# Patient Record
Sex: MALE | Race: BLACK OR AFRICAN AMERICAN | Employment: OTHER | URBAN - METROPOLITAN AREA
[De-identification: names, ages, dates, MRNs, and addresses within clinical notes are randomized per-mention and may not be internally consistent; named-entity substitution may affect disease eponyms.]

---

## 2017-09-12 ENCOUNTER — HOSPITAL ENCOUNTER (OUTPATIENT)
Dept: MRI IMAGING | Age: 77
Discharge: HOME | End: 2017-09-12
Payer: MEDICARE

## 2017-09-12 ENCOUNTER — HOSPITAL ENCOUNTER (OUTPATIENT)
Dept: LAB | Age: 77
Discharge: HOME | End: 2017-09-12
Payer: MEDICARE

## 2017-09-12 DIAGNOSIS — K76.0 FATTY LIVER: ICD-10-CM

## 2017-09-12 DIAGNOSIS — K86.2 PANCREAS CYST: ICD-10-CM

## 2017-09-12 LAB
ALBUMIN SERPL-MCNC: 3.8 G/DL (ref 3.4–5)
ALBUMIN/GLOB SERPL: 1.2 {RATIO} (ref 0.8–1.7)
ALP SERPL-CCNC: 61 U/L (ref 45–117)
ALT SERPL-CCNC: 16 U/L (ref 16–61)
AST SERPL-CCNC: 9 U/L (ref 15–37)
BILIRUB DIRECT SERPL-MCNC: 0.1 MG/DL (ref 0–0.2)
BILIRUB SERPL-MCNC: 0.5 MG/DL (ref 0.2–1)
GLOBULIN SER CALC-MCNC: 3.2 G/DL (ref 2–4)
PROT SERPL-MCNC: 7 G/DL (ref 6.4–8.2)

## 2017-09-12 PROCEDURE — 74183 MRI ABD W/O CNTR FLWD CNTR: CPT

## 2017-09-12 PROCEDURE — 74011250636 HC RX REV CODE- 250/636

## 2017-09-12 PROCEDURE — A9585 GADOBUTROL INJECTION: HCPCS

## 2017-09-12 RX ADMIN — GADOBUTROL 10 ML: 604.72 INJECTION INTRAVENOUS at 15:08

## 2018-09-01 ENCOUNTER — HOSPITAL ENCOUNTER (OUTPATIENT)
Dept: MRI IMAGING | Age: 78
Discharge: HOME | End: 2018-09-01
Payer: MEDICARE

## 2018-09-01 VITALS — BODY MASS INDEX: 25.84 KG/M2 | WEIGHT: 165 LBS

## 2018-09-01 DIAGNOSIS — K86.2 PANCREATIC CYST: ICD-10-CM

## 2018-09-01 LAB — CREAT UR-MCNC: 1.2 MG/DL (ref 0.6–1.3)

## 2018-09-01 PROCEDURE — 82565 ASSAY OF CREATININE: CPT

## 2018-09-01 PROCEDURE — A9577 INJ MULTIHANCE: HCPCS

## 2018-09-01 PROCEDURE — 74183 MRI ABD W/O CNTR FLWD CNTR: CPT

## 2018-09-01 PROCEDURE — 74011250636 HC RX REV CODE- 250/636

## 2018-09-01 RX ADMIN — GADOBENATE DIMEGLUMINE 15 ML: 529 INJECTION, SOLUTION INTRAVENOUS at 17:26

## 2018-09-01 RX ADMIN — GADOBENATE DIMEGLUMINE 5 ML: 529 INJECTION, SOLUTION INTRAVENOUS at 17:26

## 2018-09-05 ENCOUNTER — OFFICE VISIT (OUTPATIENT)
Dept: ORTHOPEDIC SURGERY | Facility: CLINIC | Age: 78
End: 2018-09-05

## 2018-09-05 VITALS
SYSTOLIC BLOOD PRESSURE: 144 MMHG | WEIGHT: 174.2 LBS | TEMPERATURE: 96.8 F | OXYGEN SATURATION: 99 % | BODY MASS INDEX: 27.34 KG/M2 | HEART RATE: 65 BPM | DIASTOLIC BLOOD PRESSURE: 77 MMHG | HEIGHT: 67 IN | RESPIRATION RATE: 16 BRPM

## 2018-09-05 DIAGNOSIS — M25.532 LEFT WRIST PAIN: ICD-10-CM

## 2018-09-05 DIAGNOSIS — S63.502A WRIST SPRAIN, LEFT, INITIAL ENCOUNTER: Primary | ICD-10-CM

## 2018-09-05 NOTE — PATIENT INSTRUCTIONS
Wear splint like a cast for 2 weeks and ice regularly Wrist Sprain: Care Instructions Your Care Instructions Your wrist hurts because you have stretched or torn ligaments, which connect the bones in your wrist. 
Wrist sprains usually take from 2 to 10 weeks to heal, but some take longer. Usually, the more pain you have, the more severe your wrist sprain is and the longer it will take to heal. You can heal faster and regain strength in your wrist with good home treatment. Follow-up care is a key part of your treatment and safety. Be sure to make and go to all appointments, and call your doctor if you are having problems. It's also a good idea to know your test results and keep a list of the medicines you take. How can you care for yourself at home? · Prop up your arm on a pillow when you ice it or anytime you sit or lie down for the next 3 days. Try to keep your wrist above the level of your heart. This will help reduce swelling. · Put ice or cold packs on your wrist for 10 to 20 minutes at a time. Try to do this every 1 to 2 hours for the next 3 days (when you are awake) or until the swelling goes down. Put a thin cloth between the ice pack and your skin. · After 2 or 3 days, if your swelling is gone, apply a heating pad set on low or a warm cloth to your wrist. This helps keep your wrist flexible. Some doctors suggest that you go back and forth between hot and cold. · If you have an elastic bandage, keep it on for the next 24 to 36 hours. The bandage should be snug but not so tight that it causes numbness or tingling. To rewrap the wrist, wrap the bandage around the hand a few times, beginning at the fingers. Then wrap it around the hand between the thumb and index finger, ending by circling the wrist several times. · If your doctor gave you a splint or brace, wear it as directed to protect your wrist until it has healed. · Take pain medicines exactly as directed. ¨ If the doctor gave you a prescription medicine for pain, take it as prescribed. ¨ If you are not taking a prescription pain medicine, ask your doctor if you can take an over-the-counter medicine. · Try not to use your injured wrist and hand. When should you call for help? Call your doctor now or seek immediate medical care if: 
  · Your hand or fingers are cool or pale or change color.  
 Watch closely for changes in your health, and be sure to contact your doctor if: 
  · Your pain gets worse.  
  · Your wrist has not improved after 1 week. Where can you learn more? Go to http://valdo-val.info/. Enter G541 in the search box to learn more about \"Wrist Sprain: Care Instructions. \" Current as of: November 29, 2017 Content Version: 11.7 © 5140-8705 [employer]. Care instructions adapted under license by [employer] (which disclaims liability or warranty for this information). If you have questions about a medical condition or this instruction, always ask your healthcare professional. [address] any warranty or liability for your use of this information.

## 2018-09-05 NOTE — PROGRESS NOTES
Ryder Echevarria is a 76 y.o. male right handed retiree. Worker's Compensation and legal considerations: none filed. Vitals:  
 09/05/18 1400 BP: 144/77 Pulse: 65 Resp: 16 Temp: 96.8 °F (36 °C) TempSrc: Oral  
SpO2: 99% Weight: 174 lb 3.2 oz (79 kg) Height: 5' 7\" (1.702 m) PainSc:   5 Chief Complaint Patient presents with  Wrist Pain LEFT WRIST PAIN  
 
 
 
HPI: L Wrist pain/Questionable fracture Date of onset:  8/31/2018 Injury: Yes: Comment: [address] Prior Treatment:  Yes: Comment: [facility] in a splint for a sprain. They called back the next day telling him he had a fracture and to wear the splint Numbness/ Tingling: No 
 
ROS: Review of Systems - General ROS: negative Respiratory ROS: no cough, shortness of breath, or wheezing Cardiovascular ROS: no chest pain or dyspnea on exertion Musculoskeletal ROS: L Wrist pain and swelling Neurological ROS: negative Past Medical History:  
Diagnosis Date  Addiction to drug ([facility] [address] Xanax  Depression  Diabetes mellitus ([facility] [address]  Hemorrhoids  Hypertension  Kidney stones  Personal history of prostate cancer  Prostate cancer ([facility] [address] Past Surgical History:  
Procedure Laterality Date  HX PROSTATECTOMY  4/1998 Loli Garrison Current Outpatient Prescriptions Medication Sig Dispense Refill  Omega-3 Fatty Acids 60- mg cpDR Take  by mouth.  calcium-cholecalciferol, d3, (CALCIUM 600 + D) 600-125 mg-unit tab Take  by mouth.  METOPROLOL TARTRATE (LOPRESSOR PO) Take  by mouth two (2) times a day.  losartan (COZAAR) 50 mg tablet Take 100 mg by mouth daily.  aspirin delayed-release 81 mg tablet Take  by mouth daily.  ALPRAZolam (XANAX) 2 mg tablet Take  by mouth.  metFORMIN (GLUCOPHAGE) 500 mg tablet Take  by mouth two (2) times daily (with meals). No Known Allergies PE: Wrist: Generalized edema and tenderness about the L wrist joint ROM: decreased due to swelling and pain Imaging: [facility] are significant for osteopenic changes but no definitve fracture seen. Though there is an area of lucency through the metaphysis. This may represent a fracture or osteopenia/osteoporosis -Repeat films done today of L wrist do not show any alignment change suggesting no fracture or stable fracture at this point. -F/U in 2 weeks for re-evaluation and xrays ICD-10-CM ICD-9-CM 1. Left wrist pain M25.532 719.43 AMB POC XRAY, WRIST; COMPLETE, 3+ VIE  
2. Wrist sprain, left, initial encounter S63.502A 842.00 Plan: Maintain splint like a cast.  Pt has been educated on keeping splint covered with a plastic bag and rubber band for showering. F/U in 2 weeks for repeat xrays Plan was reviewed with patient, who verbalized agreement and understanding of the plan

## 2018-09-19 ENCOUNTER — OFFICE VISIT (OUTPATIENT)
Dept: ORTHOPEDIC SURGERY | Facility: CLINIC | Age: 78
End: 2018-09-19

## 2018-09-19 VITALS
OXYGEN SATURATION: 95 % | RESPIRATION RATE: 16 BRPM | WEIGHT: 174.2 LBS | TEMPERATURE: 97.4 F | BODY MASS INDEX: 27.34 KG/M2 | SYSTOLIC BLOOD PRESSURE: 162 MMHG | DIASTOLIC BLOOD PRESSURE: 87 MMHG | HEIGHT: 67 IN | HEART RATE: 72 BPM

## 2018-09-19 DIAGNOSIS — S52.592D OTHER CLOSED FRACTURE OF DISTAL END OF LEFT RADIUS WITH ROUTINE HEALING, SUBSEQUENT ENCOUNTER: Primary | ICD-10-CM

## 2018-09-19 DIAGNOSIS — M25.532 LEFT WRIST PAIN: ICD-10-CM

## 2018-09-19 NOTE — PROGRESS NOTES
Markie Case is a 76 y.o. male right handed retiree. Worker's Compensation and legal considerations: none filed. Vitals:  
 09/19/18 7652 BP: 162/87 Pulse: 72 Resp: 16 Temp: 97.4 °F (36.3 °C) TempSrc: Oral  
SpO2: 95% Weight: 174 lb 3.2 oz (79 kg) Height: 5' 7\" (1.702 m) PainSc:   3 Chief Complaint Patient presents with  Wrist Pain LEFT WRIST PAIN, F/U APPT. HPI: 3 wks s/p fall with questionable L Distal Radius Fx vs sprain. He has been doing well. He has been wearing the splint since last seen in the office. He has only taken it off a few times. His pain is minimal today. Date of onset:  8/31/2018 Injury: Yes: Comment: fall Prior Treatment:  Yes: Comment: Splinted in ED Numbness/ Tingling: No 
 
ROS: Review of Systems - General ROS: negative Respiratory ROS: no cough, shortness of breath, or wheezing Cardiovascular ROS: no chest pain or dyspnea on exertion Musculoskeletal ROS: positive for - pain in wrist - left Neurological ROS: negative Past Medical History:  
Diagnosis Date  Addiction to drug ([facility] [address] Xanax  Depression  Diabetes mellitus ([facility] [address]  Hemorrhoids  Hypertension  Kidney stones  Personal history of prostate cancer  Prostate cancer ([facility] [address] Past Surgical History:  
Procedure Laterality Date  HX PROSTATECTOMY  4/1998 [facility], Jagdish Lara Current Outpatient Prescriptions Medication Sig Dispense Refill  Omega-3 Fatty Acids 60- mg cpDR Take  by mouth.  calcium-cholecalciferol, d3, (CALCIUM 600 + D) 600-125 mg-unit tab Take  by mouth.  METOPROLOL TARTRATE (LOPRESSOR PO) Take  by mouth two (2) times a day.  losartan (COZAAR) 50 mg tablet Take 100 mg by mouth daily.  aspirin delayed-release 81 mg tablet Take  by mouth daily.  ALPRAZolam (XANAX) 2 mg tablet Take  by mouth.     
 metFORMIN (GLUCOPHAGE) 500 mg tablet Take  by mouth two (2) times daily (with meals). No Known Allergies PE: L Wrist:  Mild edema over the radial aspect. Minimal tenderness in the area. Sensation in median nerve distribution is intact. Cap refill brisk. EPL intact. Imaging: Plain films reviewed today shows a nondisplaced radial styloid fracture with extension into the ulnar side of the distal radial metaphysis. ICD-10-CM ICD-9-CM 1. Other closed fracture of distal end of left radius with routine healing, subsequent encounter S52.592D V54.12 2. Left wrist pain M25.532 719.43 AMB POC XRAY, WRIST; COMPLETE, 3+ VIE  
   AMB SUPPLY ORDER Plan: Convert to Thumb Spica Splint 
-Pt has been instructed to wear the splint like a cast and not to remove it 
-He has been given instructions for showeirng including covering the splint F/U in 3 weeks He will be going back home after that, but may go before which is 5 hours away I have told him if he goes before to make an appointment with local ortho there. Plan was reviewed with patient, who verbalized agreement and understanding of the plan

## 2018-09-19 NOTE — PATIENT INSTRUCTIONS
Broken Wrist: Care Instructions Your Care Instructions Your wrist can break, or fracture, during sports, a fall, or other accidents. The break may happen when your wrist is hit or is used to protect you in a fall. Fractures can range from a small, hairline crack, to a bone or bones broken into two or more pieces. Your treatment depends on how bad the break is. Your doctor may have put your wrist in a cast or splint. This will help keep your wrist stable until your follow-up appointment. It may take weeks or months for your wrist to heal. You can help it heal with care at home. You heal best when you take good care of yourself. Eat a variety of healthy foods, and don't smoke. Follow-up care is a key part of your treatment and safety. Be sure to make and go to all appointments, and call your doctor if you are having problems. It's also a good idea to know your test results and keep a list of the medicines you take. How can you care for yourself at home? · Put ice or a cold pack on your wrist for 10 to 20 minutes at a time. Try to do this every 1 to 2 hours for the next 3 days (when you are awake). Put a thin cloth between the ice and your cast or splint. Keep your cast or splint dry. · Follow the splint or cast care instructions your doctor gives you. If you have a splint, do not take it off unless your doctor tells you to. Be careful not to put the splint on too tight. · Be safe with medicines. Take pain medicines exactly as directed. ¨ If the doctor gave you a prescription medicine for pain, take it as prescribed. ¨ If you are not taking a prescription pain medicine, ask your doctor if you can take an over-the-counter medicine. · Prop up your wrist on pillows when you sit or lie down in the first few days after the injury. Keep your wrist higher than the level of your heart. This will help reduce swelling.  
· Move your fingers often to reduce swelling and stiffness, but do not use that hand to grab or carry anything. · Follow instructions for exercises to keep your arm strong. When should you call for help? Call your doctor now or seek immediate medical care if: 
  · You have new or worse pain.  
  · Your hand or fingers are cool or pale or change color.  
  · Your cast or splint feels too tight.  
  · You have tingling, weakness, or numbness in your hand or fingers.  
 Watch closely for changes in your health, and be sure to contact your doctor if: 
  · You do not get better as expected.  
  · You have problems with your cast or splint. Where can you learn more? Go to http://valdo-val.info/. Enter [id number] in the search box to learn more about \"Broken Wrist: Care Instructions. \" Current as of: November 29, 2017 Content Version: 11.7 © 1727-2760 [employer]. Care instructions adapted under license by [employer] (which disclaims liability or warranty for this information). If you have questions about a medical condition or this instruction, always ask your healthcare professional. [address] any warranty or liability for your use of this information.

## 2023-05-19 RX ORDER — HYDRALAZINE HYDROCHLORIDE 10 MG/1
TABLET, FILM COATED ORAL
COMMUNITY

## 2023-05-19 RX ORDER — ALPRAZOLAM 2 MG/1
TABLET ORAL
COMMUNITY

## 2023-05-19 RX ORDER — ASPIRIN 81 MG/1
TABLET ORAL DAILY
COMMUNITY

## 2023-05-19 RX ORDER — LOSARTAN POTASSIUM 50 MG/1
100 TABLET ORAL DAILY
COMMUNITY